# Patient Record
Sex: MALE | Race: WHITE
[De-identification: names, ages, dates, MRNs, and addresses within clinical notes are randomized per-mention and may not be internally consistent; named-entity substitution may affect disease eponyms.]

---

## 2016-12-27 NOTE — DIAGNOSTIC IMAGING REPORT
CHEST 2 VIEWS ROUTINE



CLINICAL HISTORY: Preoperative evaluation.    



COMPARISON STUDY:  No previous studies for comparison.



FINDINGS: Lung volumes are normal. There is no pneumothorax or pleural effusion.

Mild opacity within the left lower lung likely reflects atelectasis or

epicardial fat pad. There is no evidence of pulmonary edema. There is borderline

cardiomegaly. Mediastinal contours are unremarkable. 



IMPRESSION:  



1. No acute cardiopulmonary findings.



2. Borderline cardiomegaly. 



3. Mild left basilar opacity which favors atelectasis or prominent epicardial

fat pad.









Electronically signed by:  Michi Noble M.D.

12/27/2016 9:59 AM

## 2016-12-27 NOTE — PAT MEDICATION INSTRUCTIONS
Service Date


Dec 27, 2016.





Current Home Medication List


Fish Oil (Omega-3), 1 CAP PO QPM


Ibuprofen (Motrin), 600 MG PO QID PRN for RN


Lisinopril (Prinivil), 20 MG PO QAM


Mometasone Furoate (Elocon), 1 APPLN TOP BID PRN for PRN


Terazosin (Hytrin), 5 MG PO QAM


[Otc For Muscles], 1 TAB PO QPM


[Vitamin C], 1 TAB PO QPM


[Vitamin D], 1 TAB PO QPM


[Vitamin E], 1 TAB PO QPM





Medication Instructions


For Your Scheduled Surgery 








- Hold the following medications starting today 12/27/16: 


   Vitamin E 1 TAB PO QPM


   Fish Oil ([place]-3), 1 CAP PO QPM


   OTC For Muscles 1 TAB PO QPM





- Hold the following medications 24 hours prior to surgery:


   Mometasone Furoate (Elocon), 1 APPLN TOP BID PRN for PRN





- Hold the following medications the morning of surgery:


   Lisinopril (Prinivil), 20 MG PO QAM


   Ibuprofen (Motrin), 600 MG PO QID PRN for RN (can continue per surgeon)





- Take the following medications the morning of surgery with a sip of water:


   Terazosin (Hytrin), 5 MG PO QAM





- Take the following medications as scheduled the night before surgery:


   Vitamin C 1 TAB PO QPM


   Vitamin D 1 TAB PO QPM














If you have any questions please call us at [phone] or [phone] (

[place]) or [phone]

## 2017-01-05 NOTE — [facility] POST OPERATIVE BRIEF NOTE
Immediate Operative Summary


Operative Date


Jan 5, 2017.





Pre-Operative Diagnosis





Left Scrotal Hernia





Post-Operative Diagnosis





Left Scrotal Hernia





Procedure(s) Performed





Open Left Indirect Sliding Inguinal Scrotal Hernia Repair with Mesh





Surgeon


Dr. Nieves





Assistant Surgeon(s)


LALO Diaz





Estimated Blood Loss


5 ml





Findings


sigmoid in hernia sac sliding





Specimens





none per surgeon

## 2017-01-05 NOTE — DISCHARGE INSTRUCTIONS
Discharge Instructions


Visit


Reason for Visit:  Left Scrotal Hernia





Discharge


Discharge Diagnosis / Problem:  Repair of hernia





Discharge Goals


Goal(s):  Decrease discomfort





Activity Recommendations


Activity Limitations:  per Instructions/Follow-up section


Lifting Limitations:  no more than 10 pounds


Shower/Bathe:  tomorrow


Driving or Machine Use:  resume 3 days after discharge





Anesthesia


.





Post Anesthesia Instructions:





If you have had General Anesthesia or IV Sedation:





*  Do not drive today.


*  Resume driving when surgeon permits.


*  Do not make important decisions or sign legal documents today.


*  Call surgeon for:





   1.  Temperature elevations greater than 101 degrees F.


   2.  Uncontrollable pain.


   3.  Excessive bleeding.


   4.  Persistent nausea and vomiting.


   5.  Medication intolerance (nausea, vomiting or rash).





*  For nausea and vomiting use only clear liquids such as: tea, soda, bouillon 

until nausea subsides, then gradually increase diet as tolerated.





*  If you have any concerns or questions, call your surgeon's office.  If 

physician is unavailable and it is an emergency, call 911 or go to the nearest 

emergency room.





.





Instructions / Follow-Up


Instructions / Follow-Up


Dr. Nieves in 1 week, call [phone] if you do not already have an appt


Ice incision off and on 20 minutes each until bedtime





Diet Recommendations


Recommended Home Diet:  no limitations





Pending Studies


Studies pending at discharge:  no





Medical Emergencies








.


Who to Call and When:





Medical Emergencies:  If at any time you feel your situation is an emergency, 

please call 911 immediately.





.





Non-Emergent Contact


Non-Emergency issues call your:  Surgeon


Call Non-Emergent contact if:  you have a fever, temperature is above 101.5, 

your pain is not controlled, wound has increased redness, wound has increased 

pain





.


.





"Provider Documentation" section prepared by Wilian Gomes.

## 2017-01-05 NOTE — OPERATIVE REPORT
DATE OF OPERATION:  01/05/2017

 

SURGEON:  Dr. Nieves.

 

ASSISTANT:  LALO Kennedy.

 

PREOPERATIVE DIAGNOSIS:  Large left scrotal hernia.

 

POSTOPERATIVE DIAGNOSIS:  Left indirect sliding hernia.

 

PROCEDURE:  Open repair of left indirect sliding hernia.

 

DESCRIPTION OF PROCEDURE:  The patient was brought into the operating room

theater.  The left lower quadrant was prepped with Betadine scrubbing

solution and properly draped.  We made a transverse anesthesia after

preemptive local analgesia 1% Xylocaine with epinephrine was used to

infiltrate 2 fingerbreadths medial, anterior, and superior iliac crest.  An

external oblique incision was made to the external ring and deepened through

subcutaneous tissues.  Some vessels were ligated with 2-0 silk.  The patient

had about an inch and a half of subcutaneous fatty tissue until we got to the

abdominal wall.  Identifying the external oblique along the course of its

fibers, we used more local anesthetic, incised and elevated with hemostats. 

We were able then to go to the external ring.  Once we were about the

external ring, we could see that the patient had a significant amount of

fatty tissue down towards the scrotum.  We were able to elevate that with a

Penrose drain and then we maneuvered that and then freed it up from the cord

structure that was strictly adherent and we identified that the patient had a

significant amount of sigmoid down into the hernial sac going down to the

scrotum.  We basically returned everything without opening the sac into the

indirect area using a sponge forceps.  Once we had returned everything in the

abdomen, we then closed the internal ring loosely with interrupted 3-0 silk

sutures just to approximate the tissue as was indirect defect.  Then I used a

piece of Marlex mesh and brought up onto the field.  We took a segment of it

and made a linear cutter until a piece of it to place as a plug into the

indirect area using a silk suture as a central portion and as a stay suture. 

At this point, the Marlex mesh was brought onto the field and sutured onto

the symphysis pubis and conjoined tendon down to the shelving portion and

reconstructed the internal ring.  Prior to completely reconstructing the

internal ring, we used a silk suture to take bites of the patch onto the plug

and tying it together.  The internal ring was enough to accommodate the tip

of a hemostat.  We made sure that the Marlex mesh was approximately 3-4 cm

beyond the internal inguinal laterally.  This was all placed underneath the

external oblique.  The external oblique was then closed along the cord ____

with 3-0 silk interrupted suture, reconstructing the external ring.  The

patient had a little excess tissue along the cord structures of fatty

lipomatous tissue which we did not remove.  The subcutaneous tissue with 2-0

Dexon and staples for skin edges.  Dressing was applied.  The procedure was

tolerated well by the patient.  Estimated blood loss approximately 5 mL.  The

patient was taken to recovery room in good condition.

 

 

I attest to the content of the Intraoperative Record and any orders documented therein. Any exceptio
ns are noted below.

## 2017-01-05 NOTE — HISTORY & PHYSICAL BRIDGE NOTE
H&P Re-Evaluation


Bridge Note:


I have examined the patient, reviewed the History & Physical and in the 

interval since the performance of the History & Physical I have noted the 

following changes of clinical significance: No changes noted pt marked, no one 

at bedside

## 2017-01-05 NOTE — ANESTHESIOLOGY PROGRESS NOTE
Anesthesia Post Op Note


Date & Time


Jan 5, 2017 at 15:21





Vital Signs


Pain Intensity:  1





 Vital Signs Past 12 Hours








  Date Time  Temp Pulse Resp B/P Pulse Ox O2 Delivery O2 Flow Rate FiO2


 


1/5/17 15:18 36.5       


 


1/5/17 15:15  67 17  100   


 


1/5/17 15:15  64 17     


 


1/5/17 15:13    161/99    


 


1/5/17 15:10  71 12  100   


 


1/5/17 15:10  64 12     


 


1/5/17 15:09    144/92    


 


1/5/17 15:05  66 12  100   


 


1/5/17 15:05  60 12     


 


1/5/17 15:04    166/98    


 


1/5/17 15:00  63 11  100   


 


1/5/17 15:00  62 11     


 


1/5/17 14:59    159/95    


 


1/5/17 14:55  66 10     


 


1/5/17 14:55  68 10  100   


 


1/5/17 14:53    157/103    


 


1/5/17 14:50  78 12     


 


1/5/17 14:50  80 12  100   


 


1/5/17 14:49    159/99    


 


1/5/17 14:45  80 12     


 


1/5/17 14:45  77 12  100   


 


1/5/17 14:43    154/97    


 


1/5/17 14:41    143/103    


 


1/5/17 14:35 36.3 73 16 162/81 100 Nasal Cannula 3 


 


1/5/17 11:08 36.6 69 18 159/102 98 Room Air  











Notes


Mental Status:  alert / awake / arousable, participated in evaluation


Pt Amnestic to Procedure:  Yes


Nausea / Vomiting:  adequately controlled


Pain:  adequately controlled


Airway Patency, RR, SpO2:  stable & adequate


BP & HR:  stable & adequate


Hydration State:  stable & adequate


Anesthetic Complications:  no major complications apparent

## 2017-04-18 NOTE — DIAGNOSTIC IMAGING REPORT
LUMBAR SPINE 5 VIEWS



HISTORY: Pain  RADICULOPATHY, LUMBOSACRAL REGION



COMPARISON: None.



FINDINGS: There is no fracture. There is slight scoliosis. Moderate degenerative

disc changes throughout. Moderate reactive osteophytic changes throughout.



IMPRESSION:  

Moderate degenerative change. Mild scoliosis. No acute process.







Electronically signed by:  Marcelino Luciano M.D.

4/18/2017 2:39 PM



Dictated Date/Time:  4/18/2017 2:38 PM

## 2017-04-18 NOTE — DIAGNOSTIC IMAGING REPORT
PELVIS/UNILATERAL HIP 2-3VIEWS



CLINICAL HISTORY: RADICULOPATHY pain. Neuropathy.



COMPARISON: None.



DISCUSSION: The bones and joint spaces appear intact. There is no evidence of

fracture, dislocation or bony disease. There is no evidence for soft tissue

swelling.



IMPRESSION: Negative study.







Electronically signed by:  Marcelino Luciano M.D.

4/18/2017 2:40 PM



Dictated Date/Time:  4/18/2017 2:39 PM

## 2017-09-13 NOTE — [facility] POST OPERATIVE BRIEF NOTE
Immediate Operative Summary


Operative Date


Sep 13, 2017.





Pre-Operative Diagnosis





Left eye cataract





Post-Operative Diagnosis





Same as preop





Procedure(s) Performed





Left Cataract Phacoemulsification With Intraocular Lens Implant





Surgeon


Dr. Hook





Assistant Surgeon(s)


None





Estimated Blood Loss


0 mL





Findings


left cataract





Specimens





None





Complication(s)


None





Disposition

## 2017-09-13 NOTE — DISCHARGE INSTRUCTIONS-[facility]
Discharge Instructions


Date of Service


Sep 13, 2017.





Visit


Reason for Visit:  Cataract Left Eye





Discharge


Discharge Diagnosis / Problem:  left cataract





Discharge Goals


Goal(s):  Decrease discomfort, Improve function





Activity Recommendations


Activity Limitations:  as noted below





Anesthesia


.





Post Anesthesia Instructions:





If you have had General Anesthesia or IV Sedation:





*  Do not drive today.


*  Resume driving when surgeon permits.


*  Do not make important decisions or sign legal documents today.


*  Call surgeon for:





   1.  Temperature elevations greater than 101 degrees F.


   2.  Uncontrollable pain.


   3.  Excessive bleeding.


   4.  Persistent nausea and vomiting.


   5.  Medication intolerance (nausea, vomiting or rash).





*  For nausea and vomiting use only clear liquids such as: tea, soda, bouillon 

until nausea subsides, then gradually increase diet as tolerated.





*  If you have any concerns or questions, call your surgeon's office.  If 

physician is unavailable and it is an emergency, call 911 or go to the nearest 

emergency room.





.





Instructions / Follow-Up


Instructions / Follow-Up





ACTIVITY RECOMMENDATIONS:





*  Light activities.





*  You may walk outside, read, watch television.





*  You may notice redness on the white part of the eye and some blurry vision - 

this is normal. 








MEDICATIONS:





Resume previous medications unless instructed otherwise by your surgeon.





Start all eye drops at 1 pm today:





*  Eye drops (today):


   Prednisone - one drop in operative eye every 2 hours while awake


            Ofloxacin - one drop in operative eye every  2 hours while awake


   Bromfenac - one drop in operative eye daily





SPECIAL CARE INSTRUCTIONS:





*  Tape plastic shield over eye to sleep at night.  





Call your doctor at [phone] with any concerns or problems.








FOLLOW UP VISIT:





Follow-up with Dr Hook at [place] office as scheduled.





Diet Recommendations


Home Diet:  no limitations





Procedures


Procedures Performed:  


Left Cataract Phacoemulsification With Intraocular Lens Implant





Pending Studies


Studies pending at discharge:  no





Medical Emergencies








.


Who to Call and When:





Medical Emergencies:  If at any time you feel your situation is an emergency, 

please call 911 immediately.





.





Non-Emergent Contact


Non-Emergency issues call your:  Surgeon





.


.








"Provider Documentation" section prepared by Raj Hook.








.

## 2017-09-13 NOTE — [facility] OPERATIVE REPORT
Operative Report


Date of Service


Sep 13, 2017.





Operative Report





Phaco with monofocal IOL





DATE OF OPERATION: 9/13/17





PREOPERATIVE DIAGNOSIS: Senile nuclear cataract, left eye





POSTOPERATIVE DIAGNOSIS: Senile nuclear cataract, left eye





PROCEDURE PERFORMED: Phacoemulsification with intraocular lens implantation, 

left eye





SURGEON: Dr. Raj Hook





ANESTHESIA: Topical with 1% intracameral lidocaine and monitored anesthesia care





COMPLICATIONS: None





DESCRIPTION OF PROCEDURE: 


After positively identifying the patient both verbally and by wristband in the 

preoperative area, the left eye was marked as the operative eye. The patient 

was then brought back to the operating room by the anesthesia and nursing staff 

where they were given a drop of Lidocaine and betadine into the operative eye.  

They were then sterilely prepped and draped in the standard fashion typical for 

ophthalmic surgery.  Steri-strips were placed along the upper eyelids to keep 

the lashes back, and a lid speculum was placed into the operative eye.  At this 

point, a documented time out was performed with members of the ophthalmology, 

nursing, and anesthesia staffs all agreeing upon the correct patient, correct 

location for surgery, correct procedure, and correct type and power of 

intraocular lens to be implanted.  


The microscope was then swung into position. First, a paracentesis wound was 

made using a sideport blade. Then, in sequence, 1% preservative-free lidocaine 

followed by Endocoat viscoelastic was injected into the anterior chamber.  Next

, the main incision was made with a keratome blade in triplanar fashion. A 

sharp cystotome was introduced into the eye and used to create a tear in the 

anterior capsule, which was directed into a continuous curvilinear 

capsulorrhexis using Utrata forceps.  Hydrodissection was then performed with 

BSS on a flat-tip cannula.  Next, the phacoemulsification handpiece was 

introduced into the eye and used to remove the nucleus in a divide-and-conquer 

fashion.  This was done without complication and then the irrigation-aspiration 

handpiece was introduced into the eye and used to remove all remaining cortical 

and epinuclear material.  Amvisc was then injected into the anterior chamber as 

well as into the capsular bag and using the lens injector system, an MX60 19.5 

D lens, serial number [id number], and expiration date 3/2020 was injected into 

the capsular bag and rotated into the correct position.  Next, the irrigation-

aspiration handpiece was used to remove all remaining Amvisc.  BSS was used to 

hydrate the main wound, and then BSS was injected into the paracentesis site to 

reach physiologic pressure and then the main wound was checked and found to be 

watertight.  The patient was given drops of Vigamox and Tobradex ointment into 

the operative eye, and then the surrounding area was cleaned and dried.  A 

clear plastic shield was placed over the eye and the patient was then sat up 

and taken from the operating room by the anesthesia staff having tolerated the 

procedure well and suffering no complications.  





DISPOSITION: The patient was returned to the recovery room in stable condition.


I attest to the content of the Intraoperative Record and any orders documented 

therein.  Any exceptions are noted below.

## 2017-09-13 NOTE — ANESTHESIA PROGRESS NT - [facility]
Anesthesia Post Op Note


Date & Time


Sep 13, 2017 at 11:22





Vital Signs


Pain Intensity:  0





Vital Signs Past 12 Hours








  Date Time  Temp Pulse Resp B/P (MAP) Pulse Ox O2 Delivery O2 Flow Rate FiO2


 


9/13/17 11:17  57 16 154/91 (112) 99 Room Air  


 


9/13/17 10:58 36.6 63 16 157/92 (113) 96 Room Air  


 


9/13/17 09:45 36.3 70 70 177/106 (129) 99 Room Air  











Notes


Mental Status:  alert / awake / arousable, participated in evaluation


Pt Amnestic to Procedure:  Yes


Nausea / Vomiting:  adequately controlled


Pain:  adequately controlled


Airway Patency, RR, SpO2:  stable & adequate


BP & HR:  stable & adequate


Hydration State:  stable & adequate


Anesthetic Complications:  no major complications apparent

## 2018-07-18 ENCOUNTER — HOSPITAL ENCOUNTER (OUTPATIENT)
Dept: HOSPITAL 45 - C.ULTR | Age: 78
Discharge: HOME | End: 2018-07-18
Attending: FAMILY MEDICINE
Payer: COMMERCIAL

## 2018-07-18 DIAGNOSIS — G45.9: Primary | ICD-10-CM

## 2018-07-18 NOTE — DIAGNOSTIC IMAGING REPORT
CAROTID ARTERY ULTRASOUND



CLINICAL HISTORY: Transient ischemic attack.    



COMPARISON STUDY: None.



TECHNIQUE: Real-time, grayscale, and color Doppler sonography of the carotid and

vertebral arteries was performed. Images were viewed in the transverse and

longitudinal planes.



FINDINGS:



There is mild atherosclerotic plaque. Velocity measurements are listed below.



COMMON CAROTID PEAK SYSTOLIC VELOCITY (CM/S):  RIGHT 57  LEFT 78



ICA PEAK SYSTOLIC VELOCITY (CM/S):  RIGHT 83  LEFT 85





Systolic ratios between the internal to common carotid arteries were normal.





Antegrade flow is seen in the vertebral arteries. The external carotid arteries

are patent.



Blood pressure in the right arm measured 167/91.  Blood pressure in the left arm

measured 168/85.



IMPRESSION:  



1. No evidence for hemodynamically significant stenosis.



2. Moderately elevated blood pressure, as above.







Electronically signed by:  Michi Noble M.D.

7/18/2018 12:57 PM



Dictated Date/Time:  7/18/2018 12:54 PM